# Patient Record
Sex: FEMALE | URBAN - METROPOLITAN AREA
[De-identification: names, ages, dates, MRNs, and addresses within clinical notes are randomized per-mention and may not be internally consistent; named-entity substitution may affect disease eponyms.]

---

## 2023-11-21 ENCOUNTER — APPOINTMENT (RX ONLY)
Dept: URBAN - METROPOLITAN AREA CLINIC 174 | Facility: CLINIC | Age: 58
Setting detail: DERMATOLOGY
End: 2023-11-21

## 2023-11-21 DIAGNOSIS — L40.0 PSORIASIS VULGARIS: ICD-10-CM

## 2023-11-21 DIAGNOSIS — D18.0 HEMANGIOMA: ICD-10-CM

## 2023-11-21 DIAGNOSIS — D22 MELANOCYTIC NEVI: ICD-10-CM

## 2023-11-21 DIAGNOSIS — Z71.89 OTHER SPECIFIED COUNSELING: ICD-10-CM

## 2023-11-21 DIAGNOSIS — L82.1 OTHER SEBORRHEIC KERATOSIS: ICD-10-CM

## 2023-11-21 DIAGNOSIS — L81.4 OTHER MELANIN HYPERPIGMENTATION: ICD-10-CM

## 2023-11-21 PROBLEM — D22.72 MELANOCYTIC NEVI OF LEFT LOWER LIMB, INCLUDING HIP: Status: ACTIVE | Noted: 2023-11-21

## 2023-11-21 PROBLEM — D22.5 MELANOCYTIC NEVI OF TRUNK: Status: ACTIVE | Noted: 2023-11-21

## 2023-11-21 PROBLEM — D22.39 MELANOCYTIC NEVI OF OTHER PARTS OF FACE: Status: ACTIVE | Noted: 2023-11-21

## 2023-11-21 PROBLEM — D18.01 HEMANGIOMA OF SKIN AND SUBCUTANEOUS TISSUE: Status: ACTIVE | Noted: 2023-11-21

## 2023-11-21 PROBLEM — D22.71 MELANOCYTIC NEVI OF RIGHT LOWER LIMB, INCLUDING HIP: Status: ACTIVE | Noted: 2023-11-21

## 2023-11-21 PROCEDURE — ? COUNSELING

## 2023-11-21 PROCEDURE — 99203 OFFICE O/P NEW LOW 30 MIN: CPT

## 2023-11-21 PROCEDURE — ? PRESCRIPTION

## 2023-11-21 RX ORDER — CALCIPOTRIENE 0.05 MG/ML
ENOUGH TO COVER SOLUTION TOPICAL DAILY
Qty: 60 | Refills: 2 | Status: ERX | COMMUNITY
Start: 2023-11-21

## 2023-11-21 RX ORDER — CLOBETASOL PROPIONATE 0.5 MG/ML
1 SOLUTION TOPICAL QHS
Qty: 50 | Refills: 6 | Status: ERX | COMMUNITY
Start: 2023-11-21

## 2023-11-21 RX ADMIN — CALCIPOTRIENE ENOUGH TO COVER: 0.05 SOLUTION TOPICAL at 00:00

## 2023-11-21 RX ADMIN — CLOBETASOL PROPIONATE 1: 0.5 SOLUTION TOPICAL at 00:00

## 2023-11-21 ASSESSMENT — LOCATION SIMPLE DESCRIPTION DERM
LOCATION SIMPLE: RIGHT THIGH
LOCATION SIMPLE: RIGHT SHOULDER
LOCATION SIMPLE: RIGHT SHOULDER
LOCATION SIMPLE: RIGHT POSTERIOR UPPER ARM
LOCATION SIMPLE: LEFT THIGH
LOCATION SIMPLE: RIGHT FOREHEAD
LOCATION SIMPLE: LEFT UPPER BACK
LOCATION SIMPLE: LEFT CHEEK
LOCATION SIMPLE: RIGHT CHEEK
LOCATION SIMPLE: RIGHT UPPER ARM
LOCATION SIMPLE: RIGHT UPPER BACK
LOCATION SIMPLE: LEFT POSTERIOR THIGH
LOCATION SIMPLE: LEFT THIGH
LOCATION SIMPLE: RIGHT POSTERIOR THIGH
LOCATION SIMPLE: CHEST
LOCATION SIMPLE: RIGHT PRETIBIAL REGION
LOCATION SIMPLE: RIGHT CALF
LOCATION SIMPLE: ABDOMEN
LOCATION SIMPLE: INFERIOR FOREHEAD
LOCATION SIMPLE: ANTERIOR SCALP
LOCATION SIMPLE: RIGHT LOWER BACK

## 2023-11-21 ASSESSMENT — LOCATION DETAILED DESCRIPTION DERM
LOCATION DETAILED: LEFT CENTRAL MALAR CHEEK
LOCATION DETAILED: RIGHT LATERAL ABDOMEN
LOCATION DETAILED: RIGHT POSTERIOR SHOULDER
LOCATION DETAILED: RIGHT PROXIMAL CALF
LOCATION DETAILED: LEFT MEDIAL UPPER BACK
LOCATION DETAILED: LEFT ANTERIOR PROXIMAL THIGH
LOCATION DETAILED: LEFT ANTERIOR PROXIMAL THIGH
LOCATION DETAILED: RIGHT DISTAL PRETIBIAL REGION
LOCATION DETAILED: RIGHT ANTERIOR DISTAL UPPER ARM
LOCATION DETAILED: RIGHT INFERIOR MEDIAL MIDBACK
LOCATION DETAILED: RIGHT MEDIAL UPPER BACK
LOCATION DETAILED: RIGHT DISTAL POSTERIOR THIGH
LOCATION DETAILED: RIGHT DISTAL POSTERIOR UPPER ARM
LOCATION DETAILED: SUBXIPHOID
LOCATION DETAILED: RIGHT ANTERIOR PROXIMAL THIGH
LOCATION DETAILED: UPPER STERNUM
LOCATION DETAILED: INFERIOR MID FOREHEAD
LOCATION DETAILED: PERIUMBILICAL SKIN
LOCATION DETAILED: MID-FRONTAL SCALP
LOCATION DETAILED: RIGHT ANTERIOR DISTAL THIGH
LOCATION DETAILED: RIGHT MEDIAL FOREHEAD
LOCATION DETAILED: RIGHT ANTERIOR SHOULDER
LOCATION DETAILED: RIGHT INFERIOR LATERAL MALAR CHEEK
LOCATION DETAILED: LEFT PROXIMAL POSTERIOR THIGH

## 2023-11-21 ASSESSMENT — LOCATION ZONE DERM
LOCATION ZONE: LEG
LOCATION ZONE: TRUNK
LOCATION ZONE: ARM
LOCATION ZONE: ARM
LOCATION ZONE: SCALP
LOCATION ZONE: LEG
LOCATION ZONE: FACE

## 2023-11-21 NOTE — HPI: FULL BODY SKIN EXAMINATION
[facility]    [address] [facility] [address]    Phone:  [phone]    Fax:  [phone]       Thank You for choosing us for your health care visit. We are glad to serve you and happy to provide you with this summary of your visit. Please help us to ensure we have accurate records. If you find anything that needs to be changed, please let our staff know as soon as possible.          Your Demographic Information     Patient Name Sex Charlotte Henry Female 1991       Ethnic Group Patient Race    Not of  or  Origin White      Your Visit Details     Date & Time Provider Department    2017 12:00 PM Jamin Perez MD [facility]      Your Upcoming Appointment*(Max 10)     2017  9:00 AM CDT   Consultation with Og Montilla MD   [facility] ([facility])    [address] [facility] [id number] [phone]              Your Vitals Were     BP Pulse Weight SpO2 BMI Smoking Status    112/78 (BP Location: [facility], Patient Position: Sitting, Cuff Size: Regular) 82 143 lb 8 oz (65.1 kg) 98% 27.11 kg/m2 Current Every Day Smoker      Medications Prescribed or Re-Ordered Today     amoxicillin-clavulanate (AUGMENTIN) 875-125 MG per tablet    Sig - Route: Take 1 tablet by mouth 2 times daily. - Oral    Class: Eprescribe    Pharmacy: [facility] [place] [address] AT [place] [address] Ph #: [phone]    traMADol (ULTRAM) 50 MG tablet    Sig - Route: Take 1 tablet by mouth every 8 hours as needed for Pain. - Oral    Class: Script Not Printed    Pharmacy: [facility] [place] [address] AT [facility] [address] Ph #: [phone]      Your Current Medications Are        Disp Refills Start End    ondansetron (ZOFRAN ODT) 4 MG disintegrating tablet 10 tablet 0 2017     Sig - Route: Take 1 
What Is The Reason For Today's Visit?: Full Body Skin Examination
What Is The Reason For Today's Visit? (Being Monitored For X): the development of new lesions
tablet by mouth every 6 hours as needed for Nausea. - Oral    albuterol 108 (90 BASE) MCG/ACT inhaler 1 Inhaler 5 11/15/2016     Sig - Route: Inhale 2 puffs into the lungs every 4 hours as needed for Shortness of Breath or Wheezing. - Inhalation    Class: Eprescribe    sumatriptan (IMITREX) 100 MG tablet 10 tablet 2 11/10/2016     Sig: Take 1 tablet by mouth at onset of migraine. May repeat after 2 hours if needed. Max 2 doses in 24 hrs    Class: Eprescribe    amoxicillin-clavulanate (AUGMENTIN) 875-125 MG per tablet 20 tablet 0 6/5/2017     Sig - Route: Take 1 tablet by mouth 2 times daily. - Oral    Class: Eprescribe    traMADol (ULTRAM) 50 MG tablet 10 tablet 0 6/5/2017     Sig - Route: Take 1 tablet by mouth every 8 hours as needed for Pain. - Oral    Class: Script Not Printed      Discontinued Medications        Reason for Discontinue    cephALEXin (KEFLEX) 500 MG capsule Therapy Completed      Allergies     Vicodin [Hydrocodone-acetaminophen] NAUSEA      Immunizations History as of 6/5/2017     Name Date    HPV QUAD 9/25/2007, 6/25/2007, 4/3/2007    Hepatitis B Child 1/29/1999, 10/30/1996      Problem List as of 6/5/2017     Tobacco use disorder    Depression    Irritable bowel syndrome    Major depressive disorder, recurrent, severe without psychotic features (CMS/HCC)    Anxiety state, unspecified    PTSD (post-traumatic stress disorder)            Patient Instructions     None

## 2025-01-22 ENCOUNTER — APPOINTMENT (OUTPATIENT)
Dept: URBAN - METROPOLITAN AREA CLINIC 174 | Facility: CLINIC | Age: 60
Setting detail: DERMATOLOGY
End: 2025-01-22

## 2025-01-22 DIAGNOSIS — D22 MELANOCYTIC NEVI: ICD-10-CM

## 2025-01-22 DIAGNOSIS — L81.5 LEUKODERMA, NOT ELSEWHERE CLASSIFIED: ICD-10-CM

## 2025-01-22 DIAGNOSIS — L81.4 OTHER MELANIN HYPERPIGMENTATION: ICD-10-CM

## 2025-01-22 DIAGNOSIS — D18.0 HEMANGIOMA: ICD-10-CM

## 2025-01-22 DIAGNOSIS — L82.1 OTHER SEBORRHEIC KERATOSIS: ICD-10-CM

## 2025-01-22 DIAGNOSIS — L40.0 PSORIASIS VULGARIS: ICD-10-CM

## 2025-01-22 PROBLEM — D22.39 MELANOCYTIC NEVI OF OTHER PARTS OF FACE: Status: ACTIVE | Noted: 2025-01-22

## 2025-01-22 PROBLEM — D18.01 HEMANGIOMA OF SKIN AND SUBCUTANEOUS TISSUE: Status: ACTIVE | Noted: 2025-01-22

## 2025-01-22 PROBLEM — D22.5 MELANOCYTIC NEVI OF TRUNK: Status: ACTIVE | Noted: 2025-01-22

## 2025-01-22 PROBLEM — D22.71 MELANOCYTIC NEVI OF RIGHT LOWER LIMB, INCLUDING HIP: Status: ACTIVE | Noted: 2025-01-22

## 2025-01-22 PROBLEM — D22.72 MELANOCYTIC NEVI OF LEFT LOWER LIMB, INCLUDING HIP: Status: ACTIVE | Noted: 2025-01-22

## 2025-01-22 PROCEDURE — 99213 OFFICE O/P EST LOW 20 MIN: CPT

## 2025-01-22 PROCEDURE — ? PRESCRIPTION MEDICATION MANAGEMENT

## 2025-01-22 PROCEDURE — ? COUNSELING

## 2025-01-22 PROCEDURE — ? PRESCRIPTION

## 2025-01-22 RX ORDER — CALCIPOTRIENE 50 UG/G
OINTMENT TOPICAL
Qty: 60 | Refills: 11 | Status: ERX | COMMUNITY
Start: 2025-01-22

## 2025-01-22 RX ORDER — CLOBETASOL PROPIONATE 0.5 MG/ML
1 SOLUTION TOPICAL QHS
Qty: 50 | Refills: 11 | Status: ERX

## 2025-01-22 RX ORDER — CLOBETASOL PROPIONATE 0.5 MG/G
OINTMENT TOPICAL
Qty: 45 | Refills: 11 | Status: ERX | COMMUNITY
Start: 2025-01-22

## 2025-01-22 RX ORDER — CALCIPOTRIENE 0.05 MG/ML
ENOUGH TO COVER SOLUTION TOPICAL DAILY
Qty: 60 | Refills: 11 | Status: ERX

## 2025-01-22 RX ADMIN — CLOBETASOL PROPIONATE: 0.5 OINTMENT TOPICAL at 00:00

## 2025-01-22 RX ADMIN — CALCIPOTRIENE: 50 OINTMENT TOPICAL at 00:00

## 2025-01-22 ASSESSMENT — LOCATION SIMPLE DESCRIPTION DERM
LOCATION SIMPLE: LEFT CHEEK
LOCATION SIMPLE: RIGHT THIGH
LOCATION SIMPLE: RIGHT FOREARM
LOCATION SIMPLE: LEFT THIGH
LOCATION SIMPLE: LEFT FOREARM
LOCATION SIMPLE: RIGHT SHOULDER
LOCATION SIMPLE: LEFT UPPER BACK
LOCATION SIMPLE: RIGHT UPPER ARM
LOCATION SIMPLE: ABDOMEN
LOCATION SIMPLE: RIGHT FOREHEAD
LOCATION SIMPLE: INFERIOR FOREHEAD
LOCATION SIMPLE: RIGHT LOWER BACK
LOCATION SIMPLE: CHEST
LOCATION SIMPLE: ANTERIOR SCALP
LOCATION SIMPLE: RIGHT CALF
LOCATION SIMPLE: RIGHT UPPER BACK
LOCATION SIMPLE: RIGHT POSTERIOR UPPER ARM
LOCATION SIMPLE: LEFT PRETIBIAL REGION
LOCATION SIMPLE: LEFT POSTERIOR THIGH
LOCATION SIMPLE: RIGHT POSTERIOR THIGH
LOCATION SIMPLE: RIGHT CHEEK
LOCATION SIMPLE: RIGHT PRETIBIAL REGION

## 2025-01-22 ASSESSMENT — LOCATION ZONE DERM
LOCATION ZONE: SCALP
LOCATION ZONE: TRUNK
LOCATION ZONE: LEG
LOCATION ZONE: ARM
LOCATION ZONE: FACE

## 2025-01-22 ASSESSMENT — LOCATION DETAILED DESCRIPTION DERM
LOCATION DETAILED: RIGHT ANTERIOR DISTAL THIGH
LOCATION DETAILED: RIGHT PROXIMAL PRETIBIAL REGION
LOCATION DETAILED: RIGHT DISTAL PRETIBIAL REGION
LOCATION DETAILED: RIGHT ANTERIOR PROXIMAL THIGH
LOCATION DETAILED: RIGHT PROXIMAL CALF
LOCATION DETAILED: RIGHT INFERIOR LATERAL MALAR CHEEK
LOCATION DETAILED: RIGHT DISTAL POSTERIOR THIGH
LOCATION DETAILED: SUBXIPHOID
LOCATION DETAILED: LEFT PROXIMAL PRETIBIAL REGION
LOCATION DETAILED: RIGHT MEDIAL UPPER BACK
LOCATION DETAILED: RIGHT INFERIOR MEDIAL MIDBACK
LOCATION DETAILED: LEFT PROXIMAL DORSAL FOREARM
LOCATION DETAILED: MID-FRONTAL SCALP
LOCATION DETAILED: INFERIOR MID FOREHEAD
LOCATION DETAILED: RIGHT PROXIMAL DORSAL FOREARM
LOCATION DETAILED: PERIUMBILICAL SKIN
LOCATION DETAILED: RIGHT DISTAL POSTERIOR UPPER ARM
LOCATION DETAILED: LEFT MEDIAL UPPER BACK
LOCATION DETAILED: RIGHT MEDIAL FOREHEAD
LOCATION DETAILED: RIGHT POSTERIOR SHOULDER
LOCATION DETAILED: UPPER STERNUM
LOCATION DETAILED: RIGHT ANTERIOR DISTAL UPPER ARM
LOCATION DETAILED: LEFT PROXIMAL POSTERIOR THIGH
LOCATION DETAILED: LEFT ANTERIOR PROXIMAL THIGH
LOCATION DETAILED: RIGHT LATERAL ABDOMEN
LOCATION DETAILED: LEFT CENTRAL MALAR CHEEK

## 2025-01-22 NOTE — PROCEDURE: PRESCRIPTION MEDICATION MANAGEMENT
Render In Strict Bullet Format?: No
Plan: calcipotriene 0.005 % scalp solution Daily\\nSig: Apply to affected areas on scalp daily.\\n\\nclobetasol 0.05 % scalp solution QHS\\nSig: Apply once daily to psoriasis for several weeks then as needed for flares\\n\\ncalcipotriene 0.005 % topical ointment \\nSig: Apply to scalp daily\\n\\nclobetasol 0.05 % topical ointment \\nSig: Apply to affected areas on scalp daily\\n\\nSent refills, pt uses/alternates between the above 4 prescriptions. \\nDiscussed biologics if pt desired in future-  Humira/Tremfya/Skyrizi \\nF/u prn
Detail Level: Zone

## 2025-01-22 NOTE — PROCEDURE: COUNSELING
Detail Level: Zone
SLP Acute Treatment  Plan of Care Note    [facility]    ASSESSMENT:         EGD completed on 7/15/18 which dilation was completed and a hiatal hernia, chronic reflux changes and candida esophagitis were also observed. When asked, pt reported sticking sensation this AM with meal, but also reported she ate quickly because she was so hungry. Pt able to independently recall swallow strategies to improve esophageal clearance/ decrease chances of food sticking, however report she is not able to remember to do same when actually eating. Re-educated her on printed handouts in room, in which she responded \"oh honey get those away, they aggravate me!\" Limited- no carryover of strategies.     Ongoing swallow intervention completed this date, though scope of session was limited given pt's dislike of food. Pt assessed with small amounts of mechanical soft solids (GI recommended diet) and thin liquids. Mastication/oral transit time appear functional.  Suspect premature spillage. Pharyngeal swallow initiation judged delayed and hyolaryngeal elevation/excursion appears reduced to palpation (typical for age/hx of COPD). No overt signs/sx aspiration noted. No report of globus sensation with strict use of swallow strategies, cues from writer. Recommend continue current recommendations. Continue to reinforce swallow strategies.     RECOMMENDATIONS:  1)  mechanical soft solids (per GI recs), thin liquids - may benefit from 5-6 smaller meals a day vs 3 large  2)  periodic supervision - educate on all swallow strategies PRIOR to leaving pt with tray  3)  meds whole as tolerated  4)  STRICT reflux precautions (up for all intake, up to chair for meals as able, 30 minutes post), small bites/sips, alternating liquids/solids consistently, slow pace, double swallow as needed, rest breaks as needed  5)  skilled speech intervention indicated for ongoing assessment of diet tolerance, education reinforcement as needed. Monitor 
need for further objective evaluation.       SLP Identified Barriers to Discharge: none     DIAGNOSIS: Hypoxia [R09.02]  Chronic atrial fibrillation (CMS/HCC) [I48.2]  Pneumonia of right lower lobe due to infectious organism (CMS/HCC) [J18.1]    THERAPY DIAGNOSIS:  Dysphagia, Unspecified    OBJECTIVE:    See SLP flowsheets for full details regarding the SLP therapy provided.    GOALS:  Short Term Goals to be Reviewed on : 07/24/18 (07/17/18 1539)  STG are the Same as Discharge Goals: Yes (07/17/18 1539)  Goal Agreement: Patient agrees with goals and treatment plan (07/17/18 1539)      Swallowing STG 1: Pt will clinically tolerate mechanical soft solids (recs per GI), thin liquids without overt signs/sx aspiration in >90% of intake.  (07/17/18 1539)  Swallowing STG 2: Pt will recall/use esophageal clearance straegies across >90% of intake with mod verbal/visual cues.  (07/14/18 1426)     EDUCATION:   On this date, the patient was educated on swallow strategies/use.    The response to education was: Verbalizes understanding and Needs reinforcement    PLAN:   Continue skilled SLP for Swallow Therapy  Frequency: 1/5 on 7/21 swallow (07/17/18 1536)  Plan for Next Session: ongoing reinforcement of stratety recall/use, diet tolerance     RECOMMENDATIONS FOR DISCHARGE:  Recommendations for Discharge: SLP: [facility] pending progress made in acute care/ further work up    Swallow Treatment Data Overview Last 24 hours     Subjective  Subjective Comments: Pt alert, cooperative (07/17/18 1536)    Observation  Observation Comments: Upright in bed, good recall of stratergies but limited carryover of use of strategies (07/17/18 1536)     Therapeutic Feeding  Liquids: Thin liquids (07/17/18 1536)  Solids: Mechanical soft (07/17/18 1536)    Thin Liquids  Quantity: 4 oz (07/17/18 1536)  Presentation: Straw (07/17/18 1536)  Oral Phase: Premature spillage suspected (07/17/18 1536)  Pharyngeal Phase: Delayed swallow;Decreased hyolaryngeal 
elevation (07/17/18 1536)  Comments: no overt signs/sx aspiration noted (07/17/18 1536)    Mechanical Soft  Quantity: pasta/sauce (07/17/18 1536)  Percent of Meal: 25% (07/17/18 1536)  Assist with Feeding: Independent (07/17/18 1536)  Oral Phase: Slow oral transit time;Premature spillage suspected (07/17/18 1536)  Pharyngeal Phase: Delayed swallow;Decreased hyolaryngeal elevation (07/17/18 1536)  Comments: no overt signs/sx aspiration noted (07/17/18 1536)    Other Interventions  Other Interventions: Swallow guideline instructions (07/17/18 1536)    Swallow Guideline  Swallow Guideline Instructions: Requires reinforcement to utilize (07/17/18 1536)    Esophageal Phase  Esophageal Phase Comments: reports some sticking sensation still (e.g., for breakfast when she ate too quickly, too large of bites) - no sticking with strict use of strategies this date (07/17/18 1536)    
Detail Level: Detailed

## 2025-01-22 NOTE — PROCEDURE: MIPS QUALITY
----- Message from Gosia Barajas MD sent at 7/27/2018  2:24 PM CDT -----  Please notify the patient of normal results.  
Quality 110: Preventive Care And Screening: Influenza Immunization: Influenza Immunization not Administered because Patient Refused.
Quality 130: Documentation Of Current Medications In The Medical Record: Current Medications Documented
Detail Level: Detailed